# Patient Record
Sex: FEMALE | Race: WHITE | NOT HISPANIC OR LATINO | URBAN - NONMETROPOLITAN AREA
[De-identification: names, ages, dates, MRNs, and addresses within clinical notes are randomized per-mention and may not be internally consistent; named-entity substitution may affect disease eponyms.]

---

## 2017-07-07 ENCOUNTER — TRANSCRIBE ORDERS (OUTPATIENT)
Dept: ADMINISTRATIVE | Facility: HOSPITAL | Age: 37
End: 2017-07-07

## 2017-07-07 DIAGNOSIS — G56.03 CARPAL TUNNEL SYNDROME, BILATERAL UPPER LIMBS: Primary | ICD-10-CM

## 2017-07-14 ENCOUNTER — HOSPITAL ENCOUNTER (OUTPATIENT)
Dept: NEUROLOGY | Facility: HOSPITAL | Age: 37
Discharge: HOME | End: 2017-07-14
Admitting: GENERAL PRACTICE

## 2017-07-14 DIAGNOSIS — G56.03 CARPAL TUNNEL SYNDROME, BILATERAL UPPER LIMBS: ICD-10-CM

## 2017-07-14 PROCEDURE — 95911 NRV CNDJ TEST 9-10 STUDIES: CPT

## 2017-07-14 PROCEDURE — 95911 NRV CNDJ TEST 9-10 STUDIES: CPT | Performed by: PSYCHIATRY & NEUROLOGY

## 2017-07-20 ENCOUNTER — LAB REQUISITION (OUTPATIENT)
Dept: LAB | Facility: HOSPITAL | Age: 37
End: 2017-07-20

## 2017-07-20 DIAGNOSIS — Z00.00 ENCOUNTER FOR GENERAL ADULT MEDICAL EXAMINATION WITHOUT ABNORMAL FINDINGS: ICD-10-CM

## 2017-07-20 PROCEDURE — 88307 TISSUE EXAM BY PATHOLOGIST: CPT | Performed by: GENERAL PRACTICE

## 2017-07-25 LAB
CYTO UR: NORMAL
LAB AP CASE REPORT: NORMAL
LAB AP CLINICAL INFORMATION: NORMAL
Lab: NORMAL
PATH REPORT.FINAL DX SPEC: NORMAL
PATH REPORT.GROSS SPEC: NORMAL

## 2020-11-30 ENCOUNTER — TELEPHONE (OUTPATIENT)
Dept: NEUROLOGY | Age: 40
End: 2020-11-30

## 2020-11-30 NOTE — TELEPHONE ENCOUNTER
Called patient about an appointment with Dr Layton Person, could not reach patient by phone, left message on patient voice mail about the appointment.

## 2020-12-15 ENCOUNTER — TELEPHONE (OUTPATIENT)
Dept: NEUROLOGY | Age: 40
End: 2020-12-15

## 2020-12-15 NOTE — TELEPHONE ENCOUNTER
Called patient to confirm appointment for 12-16-20 with Dr. Tila Diggs. No answer. Left a VM regarding appointment time/date.

## 2021-01-12 ENCOUNTER — TELEPHONE (OUTPATIENT)
Dept: NEUROLOGY | Age: 41
End: 2021-01-12

## 2021-01-12 NOTE — TELEPHONE ENCOUNTER
Called and spoke with the pt to see if they would like to reschedule no show appt with Dr. Pau Nicholas.  Pt rescheduled

## 2021-01-27 ENCOUNTER — TELEPHONE (OUTPATIENT)
Dept: NEUROLOGY | Age: 41
End: 2021-01-27

## 2021-01-27 ENCOUNTER — OFFICE VISIT (OUTPATIENT)
Dept: NEUROLOGY | Age: 41
End: 2021-01-27
Payer: MEDICAID

## 2021-01-27 VITALS
SYSTOLIC BLOOD PRESSURE: 116 MMHG | HEIGHT: 66 IN | WEIGHT: 168 LBS | DIASTOLIC BLOOD PRESSURE: 80 MMHG | BODY MASS INDEX: 27 KG/M2 | HEART RATE: 86 BPM

## 2021-01-27 DIAGNOSIS — M54.2 PAIN, NECK: ICD-10-CM

## 2021-01-27 DIAGNOSIS — H53.149 PHOTOPHOBIA: ICD-10-CM

## 2021-01-27 DIAGNOSIS — R11.0 NAUSEA: ICD-10-CM

## 2021-01-27 DIAGNOSIS — G43.719 INTRACTABLE CHRONIC MIGRAINE WITHOUT AURA AND WITHOUT STATUS MIGRAINOSUS: Primary | ICD-10-CM

## 2021-01-27 PROCEDURE — 99204 OFFICE O/P NEW MOD 45 MIN: CPT | Performed by: PSYCHIATRY & NEUROLOGY

## 2021-01-27 RX ORDER — ZOLPIDEM TARTRATE 10 MG/1
TABLET ORAL
COMMUNITY
Start: 2020-11-24

## 2021-01-27 RX ORDER — SERTRALINE HYDROCHLORIDE 25 MG/1
TABLET, FILM COATED ORAL
COMMUNITY
Start: 2020-11-16

## 2021-01-27 RX ORDER — LISINOPRIL AND HYDROCHLOROTHIAZIDE 12.5; 1 MG/1; MG/1
TABLET ORAL
COMMUNITY

## 2021-01-27 RX ORDER — AMITRIPTYLINE HYDROCHLORIDE 100 MG/1
100 TABLET, FILM COATED ORAL NIGHTLY
Qty: 30 TABLET | Refills: 5 | Status: SHIPPED | OUTPATIENT
Start: 2021-01-27

## 2021-01-27 RX ORDER — HYDROCODONE BITARTRATE AND ACETAMINOPHEN 7.5; 325 MG/1; MG/1
TABLET ORAL
COMMUNITY
Start: 2021-01-12

## 2021-01-27 RX ORDER — LEVOTHYROXINE AND LIOTHYRONINE 57; 13.5 UG/1; UG/1
TABLET ORAL
COMMUNITY

## 2021-01-27 RX ORDER — DULOXETIN HYDROCHLORIDE 60 MG/1
CAPSULE, DELAYED RELEASE ORAL
COMMUNITY
Start: 2020-11-16

## 2021-01-27 RX ORDER — BUTALBITAL, ACETAMINOPHEN AND CAFFEINE 50; 325; 40 MG/1; MG/1; MG/1
1 TABLET ORAL EVERY 6 HOURS PRN
Qty: 30 TABLET | Refills: 5 | Status: SHIPPED | OUTPATIENT
Start: 2021-01-27

## 2021-01-27 SDOH — HEALTH STABILITY: MENTAL HEALTH: HOW OFTEN DO YOU HAVE A DRINK CONTAINING ALCOHOL?: NEVER

## 2021-01-27 NOTE — TELEPHONE ENCOUNTER
HERNESTO BELT KeyArthuro Gall - PA Case ID: [id number] - Rx #: [id number] Need help?  Call us at [phone]   Status   Sent to [facility]   DrugButalbital-APAP-Caffeine -40MG tablets   FormWellCare Medicaid Electronic Prior Authorization Request Form   Original Claim Info75 Prior Authorization Required

## 2021-01-27 NOTE — PROGRESS NOTES
Chief Complaint   Patient presents with    New Patient     Referred by Johnson George for migraines        Kanchan Rubio is a 41y.o. year old female who is seen for evaluation of migraines. The patient indicates that she has had migraines since her teens. Over time they have worsened. She gets at least 2-3 migraines a week associated and triggered by lights and sounds with vomiting. She does have neck pain and visual change. She does have a family history of migraines. Her previous imaging studies have been unremarkable according to her. Topamax made her hair fall out. She has tried Imitrex, Maxalt, Relpax and Neurontin. She does have insomnia. She has been on Cymbalta for years and Zoloft for about one year. Active Ambulatory Problems     Diagnosis Date Noted    Intractable chronic migraine without aura and without status migrainosus 2021    Photophobia 2021    Nausea 2021    Pain, neck 2021     Resolved Ambulatory Problems     Diagnosis Date Noted    No Resolved Ambulatory Problems     Past Medical History:   Diagnosis Date    Chronic pain     Depression with anxiety     Headache     Hypertension        Past Surgical History:   Procedure Laterality Date     SECTION      CHOLECYSTECTOMY      HYSTERECTOMY, TOTAL ABDOMINAL         History reviewed. No pertinent family history.     Allergies   Allergen Reactions    Azithromycin     Contrast [Iodides]     Eszopiclone Hives    Moxifloxacin Hives    Other Other (See Comments)    Shellfish-Derived Products Other (See Comments)    Vioxx [Rofecoxib]        Social History     Socioeconomic History    Marital status:      Spouse name: Not on file    Number of children: Not on file    Years of education: Not on file    Highest education level: Not on file   Occupational History    Not on file   Social Needs    Financial resource strain: Not on file    Food insecurity     Worry: Not on file     Inability: Not 7.5-325 MG per tablet TAKE 1 TABLET BY MOUTH THREE TIMES DAILY      zolpidem (AMBIEN) 10 MG tablet Take 1 tablet every day by oral route at bedtime for 21 days.  thyroid (ARMOUR THYROID) 90 MG tablet Take 1 tablet(s) every day by oral route      DULoxetine (CYMBALTA) 60 MG extended release capsule TAKE 1 CAPSULE BY MOUTH EVERY DAY      sertraline (ZOLOFT) 25 MG tablet TAKE 1 TABLET BY MOUTH EVERY DAY      amitriptyline (ELAVIL) 100 MG tablet Take 1 tablet by mouth nightly 30 tablet 5    butalbital-acetaminophen-caffeine (ESGIC) -40 MG per tablet Take 1 tablet by mouth every 6 hours as needed for Headaches 30 tablet 5     No current facility-administered medications for this visit. /80   Pulse 86   Ht 5' 6\" (1.676 m)   Wt 168 lb (76.2 kg)   BMI 27.12 kg/m²     Constitutional - well developed, well nourished. Eyes - conjunctiva normal.  Pupils not tested  Ear, nose, throat -hearing intact to finger rub No scars, masses, or lesions over external nose or ears, no atrophy of tongue  Neck-symmetric, no masses noted, no jugular vein distension  Respiration- chest wall appears symmetric, good expansion,   normal effort without use of accessory muscles  Musculoskeletal - no significant wasting of muscles noted, no bony deformities  Extremities-no clubbing, cyanosis or edema  Skin - warm, dry, and intact. No rash, erythema, or pallor.   Psychiatric - mood, affect, and behavior appear normal.      Neurological exam  Awake, alert, fluent oriented x 3 appropriate affect  Attention and concentration appear appropriate  Recent and remote memory appears unremarkable  Speech normal without dysarthria  No clear issues with language of fund of knowledge    Cranial Nerve Exam   CN II- Visual fields grossly unremarkable  CN III, IV,VI-EOMI, No nystagmus, conjugate eye movements, no ptosis  CN V-sensation intact to LT over face  CN VII-no facial assymetry  CN VIII-Hearing intact to finger rub  CN IX and X- Palate not tested  CN XI-not test shoulder shrug  CN XII-Tongue midline with no fasciculations or fibrillations    Motor Exam  V/V throughout upper and lower extremities bilaterally, no cogwheeling, normal tone    Sensory Exam  Sensation intact to light touch and temperature upper and lower extremities bilaterally    Reflexes   2+ biceps bilaterally  2+ brachioradialis  2+patella  2+ ankle jerks  No clonus ankles  No Marsh's sign bilateral hands    Tremors- no tremors in hands or head noted    Gait  Normal base and speed  No ataxia    Coordination  Finger to nose-unremarkable    No results found for: [id number]  No results found for: WBC, HGB, HCT, MCV, PLT  No results found for: NA, K, CL, CO2, BUN, CREATININE, GLUCOSE, CALCIUM, PROT, LABALBU, BILITOT, ALKPHOS, AST, ALT, LABGLOM, GFRAA, AGRATIO, GLOB        Assessment    ICD-10-CM    1. Intractable chronic migraine without aura and without status migrainosus  G43.719 butalbital-acetaminophen-caffeine (ESGIC) -40 MG per tablet   2. Photophobia  H53.149    3. Pain, neck  M54.2    4. Nausea  R11.0        Her neurological examination today was unremarkable. Based upon her history and examination she appears to have frequent migraines. At this time she will be scheduled for Botox. She was advised to wean off Zoloft and she will be started on Elavil for migraine prophylaxis and treatment of insomnia. She will also be given Fioricet for abortive treatment of headache. The patient indicated understanding of the management plan. She is to follow up with me in 6 weeks and call with any issues. We did discuss an occipital nerve block and the new CGRP antagonist medications. Plan    No orders of the defined types were placed in this encounter.       Orders Placed This Encounter   Medications    amitriptyline (ELAVIL) 100 MG tablet     Sig: Take 1 tablet by mouth nightly     Dispense:  30 tablet     Refill:  5    butalbital-acetaminophen-caffeine (ESGIC)

## 2021-02-25 ENCOUNTER — TELEPHONE (OUTPATIENT)
Dept: PAIN MANAGEMENT | Age: 41
End: 2021-02-25

## 2021-02-25 NOTE — TELEPHONE ENCOUNTER
Call was returned to patient per her request related to Botox denial. Unable to leave voicemail because voicemail box was full

## 2021-03-09 ENCOUNTER — TELEPHONE (OUTPATIENT)
Dept: NEUROLOGY | Age: 41
End: 2021-03-09

## 2021-03-23 ENCOUNTER — TELEPHONE (OUTPATIENT)
Dept: NEUROLOGY | Age: 41
End: 2021-03-23

## 2021-03-23 NOTE — TELEPHONE ENCOUNTER
Florin Lindsay called to reschedule a ov with bj. Per pt was advised to call if 4/30 didn't work for her. She is requesting to be sen sooner. Also asking about botox PA    Please be advised that the best time to call her to accommodate their needs is Anytime. Please leave message if no answer  Thank you.

## 2021-03-24 NOTE — TELEPHONE ENCOUNTER
I called and spoke with the patient, I got her a sooner appt, she is aware of the time and date. I told her she would have to call pain management to see about the PA, I gave her their number.

## 2021-04-07 ENCOUNTER — TELEPHONE (OUTPATIENT)
Dept: NEUROLOGY | Age: 41
End: 2021-04-07

## 2021-04-07 NOTE — TELEPHONE ENCOUNTER
Called patient to reschedule no show appt with Alina Santiago, left a voicemail for the patient to give us a call back if they would like to reschedule.

## 2021-06-14 ENCOUNTER — TELEPHONE (OUTPATIENT)
Dept: NEUROLOGY | Age: 41
End: 2021-06-14

## 2021-06-14 NOTE — TELEPHONE ENCOUNTER
Called the patient to see if they would like to reschedule the no show appointment with Dr. Hilaria Pizarro, could not reach the patient. I left a voicemail to call [phone] if they would like to reschedule.

## 2022-01-05 ENCOUNTER — TELEPHONE (OUTPATIENT)
Dept: NEUROLOGY | Age: 42
End: 2022-01-05

## 2022-01-05 NOTE — TELEPHONE ENCOUNTER
Called patient to see if patient would like to reschedule no show appt, left a voicemail with a call back number if they would like to reschedule.

## 2022-01-05 NOTE — TELEPHONE ENCOUNTER
Patient has 6 no showed appointments and canceled multiple other appointments. She was sent a letter a while back about her no shows and she has no showed again. Patient has been discharged.

## 2022-05-12 ENCOUNTER — TELEPHONE (OUTPATIENT)
Dept: NEUROLOGY | Age: 42
End: 2022-05-12

## 2022-05-12 NOTE — TELEPHONE ENCOUNTER
Patient called requesting appointment AFTER they have been dismissed from practice - please call patient to advise of options    1/27/2021    Date of Dismissal:

## 2023-03-28 ENCOUNTER — TRANSCRIBE ORDERS (OUTPATIENT)
Dept: ADMINISTRATIVE | Facility: HOSPITAL | Age: 43
End: 2023-03-28
Payer: COMMERCIAL